# Patient Record
(demographics unavailable — no encounter records)

---

## 2025-01-16 NOTE — CONSULT LETTER
[FreeTextEntry2] : Dr. Camilo Yap (PCP/Referring) Dr. Neftaly Wiggins (Cardiology) [FreeTextEntry3] : Peterson Ngo MD, MPH  System Director of Thoracic Surgery  Director of Comprehensive Lung and Foregut [place]  Professor Cardiovascular & Thoracic Surgery   [facility] at [facility] [phone] [place] [address] [place] [address] Tel: [phone] Fax: [phone]

## 2025-01-16 NOTE — CONSULT LETTER
[FreeTextEntry2] : Dr. Camilo Yap (PCP/Referring) Dr. Neftaly Wiggins (Cardiology) [FreeTextEntry3] : Peterson Ngo MD, MPH  System Director of Thoracic Surgery  Director of Comprehensive Lung and Foregut [place]  Professor Cardiovascular & Thoracic Surgery   [facility] at [facility] [phone] [place] [address] [place] [address] Tel: [phone] Fax: [phone]

## 2025-01-16 NOTE — ASSESSMENT
[FreeTextEntry1] : Mr. CHARLES GRAHAM, 46 year old male, never smoker, w/ no significant medical history, who presented with incidental finding of pulmonary cyst on a recent coronary CT, referred by Dr. Camilo Yap (PCP).  CT Chest on 12/6/24: - well-circumscribed anteromedial basal right lower lobe cyst, condyle formerly plastered alongside the right lower heart border and right hemidiaphragm remains grossly stable, 7.5 cm L x 2.8 cm W x 7.7 cm - 4 mm FEROZ semi solid nodule  I have reviewed the patient's medical records and diagnostic images at time of this office consultation and have made the following recommendation: 1. CT Chest reviewed with patient which demonstrated stable RL cyst measures 7.5 cm L x 2.8 cm W x 7.7 cm. Patient previously opt for surveillance however today he would like to consider for surgery. I recommended surgery Right VATS, Right Lower bullectomy on 2/18/25. Risks of surgery includes but not limited to pain, infection, bleeding, injuries to surrounding structures, and need for further procedure, stroke or death. Benefits and alternatives explained to patient, as well as the estimated recovery time. 2. Medical clearance needed.  I, MAGGIE Perez, personally performed the evaluation and management (E/M) services for this established patient who presents today with (a) new problem(s)/exacerbation of (an) existing condition(s).  That E/M includes conducting the examination, assessing all new/exacerbated conditions, and establishing a new plan of care.  Today, my ACP, PATRICIA Beavers was here to observe my evaluation and management services for this new problem/exacerbated condition to be followed going forward.

## 2025-01-16 NOTE — HISTORY OF PRESENT ILLNESS
[FreeTextEntry1] : Mr. CHARLES GRAHAM, 46 year old male, never smoker, w/ no significant medical history, who presented with incidental finding of pulmonary cyst on a recent coronary CT, referred by Dr. Camilo Yap (PCP).  CT Angio Coronary on 4/16/24 at [facility]: - Normal coronary arteries with a right dominant circulation. No obstructive coronary artery disease is noted. There is no evidence for coronary artery anomaly. - No coronary artery calcium (calcium score = 0). - The aortic root measures 3.4 cm. The main pulmonary artery measures 2.2 cm. - a 7.0 cm left basilar pulmonary cyst. There is no pleural or pericardial effusion. No enlarged lymph nodes are noted.  PFTs on 5/15/24 by Dr. Dom Ortiz: %, FEV1 109%, DLCO 120%.  CT Chest on 6/11/24 at [facility]: - A well-circumscribed cyst within the anteromedial basal right lower lobe measures up to 7.2 cm L x 2.8 cm W x 7.7 cm AP.  - 3 mm lateral right upper lobe (image 104) and 4 mm lateral left upper lobe (image 81) discrete semisolid nodules.  CT Chest on 12/6/24: - well-circumscribed anteromedial basal right lower lobe cyst, condyle formerly plastered alongside the right lower heart border and right hemidiaphragm remains grossly stable, 7.5 cm L x 2.8 cm W x 7.7 cm - 4 mm FEROZ semi solid nodule  Pt presents today for follow up. Patient denies cough, SOB, pain or difficulty in breathing.  Cardiologist: Dr Neftaly Wiggins.

## 2025-01-16 NOTE — HISTORY OF PRESENT ILLNESS
[FreeTextEntry1] : Mr. CHARLES GRAHAM, 46 year old male, never smoker, w/ no significant medical history, who presented with incidental finding of pulmonary cyst on a recent coronary CT, referred by Dr. Camilo Yap (PCP).  CT Angio Coronary on 4/16/24 at [facility]: - Normal coronary arteries with a right dominant circulation. No obstructive coronary artery disease is noted. There is no evidence for coronary artery anomaly. - No coronary artery calcium (calcium score = 0). - The aortic root measures 3.4 cm. The main pulmonary artery measures 2.2 cm. - a 7.0 cm left basilar pulmonary cyst. There is no pleural or pericardial effusion. No enlarged lymph nodes are noted.  PFTs on 5/15/24 by Dr. Dom Ortiz: %, FEV1 109%, DLCO 120%.  CT Chest on 6/11/24 at [facility]: - A well-circumscribed cyst within the anteromedial basal right lower lobe measures up to 7.2 cm L x 2.8 cm W x 7.7 cm AP.  - 3 mm lateral right upper lobe (image 104) and 4 mm lateral left upper lobe (image 81) discrete semisolid nodules.  CT Chest on 12/6/24: - well-circumscribed anteromedial basal right lower lobe cyst, condyle formerly plastered alongside the right lower heart border and right hemidiaphragm remains grossly stable, 7.5 cm L x 2.8 cm W x 7.7 cm - 4 mm FEROZ semi solid nodule  Pt presents today for follow up. Patient denies cough, SOB, pain or difficulty in breathing.  Cardiologist: Dr Neftaly Wiggins.

## 2025-02-28 NOTE — ASSESSMENT
[FreeTextEntry1] : Mr. CHARLES GRAHAM, 46 year old male, never smoker, w/ no significant medical history, who presented with incidental finding of pulmonary cyst on a recent coronary CT, referred by Dr. Camilo Yap (PCP).  CT Chest on 12/6/24: - well-circumscribed anteromedial basal right lower lobe cyst, condyle formerly plastered alongside the right lower heart border and right hemidiaphragm remains grossly stable, 7.5 cm L x 2.8 cm W x 7.7 cm - 4 mm FEROZ semi solid nodule  S/p ~2 weeks Rt VATs RA, RLL bulla Wedge Rxn, & RLL lung tear Wedge Rxn done on 2/18/25. Path revealed: RLL wedge rxn with emphysematous change. RLL bullectomy showed emphysematous bulla.   CXRAY done & reviewed ---  I have reviewed the patient's medical records and diagnostic images at time of this office consultation and have made the following recommendation: 1.

## 2025-02-28 NOTE — CONSULT LETTER
[Dear  ___] : Dear  [unfilled], [Consult Letter:] : I had the pleasure of evaluating your patient, [unfilled]. [( Thank you for referring [unfilled] for consultation for _____ )] : Thank you for referring [unfilled] for consultation for [unfilled] [Please see my note below.] : Please see my note below. [Consult Closing:] : Thank you very much for allowing me to participate in the care of this patient.  If you have any questions, please do not hesitate to contact me. [Sincerely,] : Sincerely, [DrNathan  ___] : Dr. MOREL [FreeTextEntry2] : Dr. Camilo Yap (PCP/Referring) Dr. Neftaly Wiggins (Cardiology) [FreeTextEntry3] : Peterson Ngo MD, MPH  System Director of Thoracic Surgery  Director of Comprehensive Lung and Foregut [place]  Professor Cardiovascular & Thoracic Surgery   [facility] at [facility] [phone] [place] [address] [place] [address] Tel: [phone] Fax: [phone]

## 2025-02-28 NOTE — REASON FOR VISIT
[de-identified] : Rt VATs RA, RLL bulla Wedge Rxn, & RLL lung tear Wedge Rxn [de-identified] : 02/18/2025

## 2025-03-06 NOTE — REASON FOR VISIT
[de-identified] : Rt VATS, RA, wedge rxn of RLL bulla and RLL lung tear [de-identified] : 02/18/2025

## 2025-03-06 NOTE — REASON FOR VISIT
[de-identified] : Rt VATS, RA, wedge rxn of RLL bulla and RLL lung tear [de-identified] : 02/18/2025

## 2025-03-06 NOTE — CONSULT LETTER
[FreeTextEntry2] : Dr. Camilo Yap (PCP/Referring) Dr. Neftaly Wiggins (Cardiology) [FreeTextEntry3] : Peterson Ngo MD, MPH  System Director of Thoracic Surgery  Director of Comprehensive Lung and Foregut [place]  Professor Cardiovascular & Thoracic Surgery   [facility] at [facility] [phone] [place] [address] [place] [address] Tel: [phone] Fax: [phone]

## 2025-03-06 NOTE — ASSESSMENT
[FreeTextEntry1] : Mr. CHARLES GRAHAM, 46 year old male, never smoker, w/ no significant medical history, who presented with incidental finding of pulmonary cyst on a recent coronary CT, referred by Dr. Camilo Yap (PCP).  CT Angio Coronary on 4/16/24 at [facility]: - Normal coronary arteries with a right dominant circulation. No obstructive coronary artery disease is noted. There is no evidence for coronary artery anomaly. - No coronary artery calcium (calcium score = 0). - The aortic root measures 3.4 cm. The main pulmonary artery measures 2.2 cm. - a 7.0 cm left basilar pulmonary cyst. There is no pleural or pericardial effusion. No enlarged lymph nodes are noted.  PFTs on 5/15/24 by Dr. Dom Ortiz: %, FEV1 109%, DLCO 120%.  CT Chest on 6/11/24 at [facility]: - A well-circumscribed cyst within the anteromedial basal right lower lobe measures up to 7.2 cm L x 2.8 cm W x 7.7 cm AP. - 3 mm lateral right upper lobe (image 104) and 4 mm lateral left upper lobe (image 81) discrete semisolid nodules.  CT Chest on 12/6/24: - well-circumscribed anteromedial basal right lower lobe cyst, condyle formerly plastered alongside the right lower heart border and right hemidiaphragm remains grossly stable, 7.5 cm L x 2.8 cm W x 7.7 cm - 4 mm FEROZ semi solid nodule  Now s/p Rt VATS, RA, wedge rxn of RLL bulla and RLL lung tear on 2/18/25. Path revealed emphysematous bulla and changes.   CXR today----- stable post-op changes.  Presents today for post-op visit. Overall, he reports to be feeling well. Denies any chest pain, shortness of breath, cough, hemoptysis, fever, or chills.  Surgical incision healing well, no sign of infection noted.   I have reviewed the patient's medical records and diagnostic images at time of this office consultation and have made the following recommendation: - CXR reviewed with patient, stable post-op findings, no ptx noted. Path revealing emphysematous changes and bulla. Negative for malignancy. No further thoracic surgery intervention at this time. RTC as needed, he is agreeable.   Recommendations reviewed with patient during this office visit, and all questions answered; Patient instructed on the importance of follow up and verbalizes understanding.    I, MAGGIE Perez, personally performed the evaluation and management (E/M) services for this established patient. That E/M includes conducting the examination, assessing all new/exacerbated conditions, and establishing a new plan of care. Today, my ACP, Murtaza Chung NP, was here to observe my evaluation and management services for this new problem/exacerbated condition to be followed going forward.

## 2025-03-06 NOTE — ASSESSMENT
[FreeTextEntry1] : Mr. CHARLES GRAHAM, 46 year old male, never smoker, w/ no significant medical history, who presented with incidental finding of pulmonary cyst on a recent coronary CT, referred by Dr. Camilo Yap (PCP).  CT Angio Coronary on 4/16/24 at [facility]: - Normal coronary arteries with a right dominant circulation. No obstructive coronary artery disease is noted. There is no evidence for coronary artery anomaly. - No coronary artery calcium (calcium score = 0). - The aortic root measures 3.4 cm. The main pulmonary artery measures 2.2 cm. - a 7.0 cm left basilar pulmonary cyst. There is no pleural or pericardial effusion. No enlarged lymph nodes are noted.  PFTs on 5/15/24 by Dr. Dom Ortiz: %, FEV1 109%, DLCO 120%.  CT Chest on 6/11/24 at [facility]: - A well-circumscribed cyst within the anteromedial basal right lower lobe measures up to 7.2 cm L x 2.8 cm W x 7.7 cm AP. - 3 mm lateral right upper lobe (image 104) and 4 mm lateral left upper lobe (image 81) discrete semisolid nodules.  CT Chest on 12/6/24: - well-circumscribed anteromedial basal right lower lobe cyst, condyle formerly plastered alongside the right lower heart border and right hemidiaphragm remains grossly stable, 7.5 cm L x 2.8 cm W x 7.7 cm - 4 mm FEROZ semi solid nodule  Now s/p Rt VATS, RA, wedge rxn of RLL bulla and RLL lung tear on 2/18/25. Path revealed emphysematous bulla and changes.   CXR today----- stable post-op changes.  Presents today for post-op visit. Overall, he reports to be feeling well. Denies any chest pain, shortness of breath, cough, hemoptysis, fever, or chills.  Surgical incision healing well, no sign of infection noted.   I have reviewed the patient's medical records and diagnostic images at time of this office consultation and have made the following recommendation: - CXR reviewed with patient, stable post-op findings, no ptx noted. Path revealing emphysematous changes and bulla. Negative for malignancy. No further thoracic surgery intervention at this time. RTC as needed, he is agreeable.   Recommendations reviewed with patient during this office visit, and all questions answered; Patient instructed on the importance of follow up and verbalizes understanding.    I, MAGGIE Perez, personally performed the evaluation and management (E/M) services for this established patient. That E/M includes conducting the examination, assessing all new/exacerbated conditions, and establishing a new plan of care. Today, my ACP, Murtaza Chung NP, was here to observe my evaluation and management services for this new problem/exacerbated condition to be followed going forward.

## 2025-03-06 NOTE — CONSULT LETTER
[FreeTextEntry2] : Dr. Camilo Yap (PCP/Referring) Dr. Neftaly Wiggins (Cardiology) [FreeTextEntry3] : Peterson Ngo MD, MPH  System Director of Thoracic Surgery  Director of Comprehensive Lung and Foregut [place]  Professor Cardiovascular & Thoracic Surgery   [facility] at [facility] [phone] [place] [address] [place] [address] Tel: [phone] Fax: [phone]